# Patient Record
Sex: MALE | Race: WHITE | URBAN - METROPOLITAN AREA
[De-identification: names, ages, dates, MRNs, and addresses within clinical notes are randomized per-mention and may not be internally consistent; named-entity substitution may affect disease eponyms.]

---

## 2017-08-18 ENCOUNTER — HOSPITAL ENCOUNTER (OUTPATIENT)
Dept: PHYSICAL THERAPY | Age: 77
Discharge: HOME | End: 2017-08-31
Admitting: PHYSICAL MEDICINE & REHABILITATION

## 2017-08-18 ASSESSMENT — PAIN SCALES - GENERAL: PAINLEVEL_OUTOF10: 6

## 2017-10-19 ENCOUNTER — HOSPITAL ENCOUNTER (OUTPATIENT)
Dept: PHYSICAL THERAPY | Age: 77
Discharge: HOME | End: 2017-10-19
Admitting: PHYSICAL MEDICINE & REHABILITATION

## 2017-10-19 NOTE — FLOWSHEET NOTE
Physical Therapy Daily Treatment Note    Date:  10/19/2017    Patient Name:  Diandra Medina   \"Samir\"  :  1940  MRN: [id number]  Restrictions/Precautions:    Medical/Treatment Diagnosis Information:  · Diagnosis: LBP  Insurance/Certification information:  PT Insurance Information: Medicare  Physician Information:  Referring Practitioner: Roz Dominique  Plan of care signed (Y/N):  N  Visit# / total visits:      G-Code (if applicable):         PT G-Codes  Functional Assessment Tool Used: Modified Oswestry   Score: 42% disability   Functional Limitation: Mobility: Walking and moving around  Mobility: Walking and Moving Around Current Status (): At least 40 percent but less than 60 percent impaired, limited or restricted  Mobility: Walking and Moving Around Goal Status (): At least 1 percent but less than 20 percent impaired, limited or restricted    Medicare Cap (if applicable):  $307.49 = total amount used, updated 10/19/2017    Time in:  10:00      Timed Treatment: 30 Total Treatment Time:  30  ________________________________________________________________________________________    Pain Level:    4/10  SUBJECTIVE:  Pt continues to c/o pain in the LB. Pt c/o L S' pain the past couple of days with insidious onset. OBJECTIVE:       Exercise/Equipment Resistance/Repetitions Other comments          TG #5 with MHP              MRE clam 1                                                           BP cuff PPT holds with slide outs LE       MRe clam 1     MOD HS       Other Therapeutic Activities:      Manual Treatments:  B long distraction grade 4, MWM B hip into ABD/ER, STM B hip vale gluteal region, breaking bread lumbar spine grade 3, Harmonics BLE,     Modalities:  MHP with TG    Test/Measurements:         ASSESSMENT:     Difficulty with PPT holds and LE motion.   MD note    Treatment/Activity Tolerance:   [x] Patient tolerated treatment well [] Patient limited by fatique  [] Patient limited by pain [] Patient limited by other medical complications  [] Other:     Goals:        Long term goals  Time Frame for Long term goals : 4 weeks  Long term goal 1: Pt independent wtih HEP  Long term goal 2: Pt stand without limits to LBP/buttock pain  Long term goal 3: Pt return to 90% PLOF  Long term goal 4: Pt hip strength B to improve by 1/3 muscle grade     Plan: [x] Continue per plan of care [] Alter current plan (see comments)   [] Plan of care initiated [] Hold pending MD visit [] Discharge      Plan for Next Session:  Correction of biomechanical dysfunctions as they present on visit. Restore proper motor firing pattern and functional muscle activation as presented on visit. Progress as tolerates.            Signature:  Natalee Vargas, PT

## 2017-11-01 ENCOUNTER — HOSPITAL ENCOUNTER (OUTPATIENT)
Dept: OTHER | Age: 77
Discharge: HOME | End: 2017-11-30
Attending: PHYSICAL MEDICINE & REHABILITATION | Admitting: PHYSICAL MEDICINE & REHABILITATION

## 2017-12-01 ENCOUNTER — HOSPITAL ENCOUNTER (OUTPATIENT)
Dept: OTHER | Age: 77
Discharge: HOME | End: 2017-12-20
Attending: PHYSICAL MEDICINE & REHABILITATION | Admitting: PHYSICAL MEDICINE & REHABILITATION